# Patient Record
Sex: MALE
[De-identification: names, ages, dates, MRNs, and addresses within clinical notes are randomized per-mention and may not be internally consistent; named-entity substitution may affect disease eponyms.]

---

## 2020-02-21 ENCOUNTER — APPOINTMENT (OUTPATIENT)
Dept: ORTHOPEDIC SURGERY | Facility: CLINIC | Age: 45
End: 2020-02-21
Payer: COMMERCIAL

## 2020-02-21 VITALS
HEART RATE: 86 BPM | WEIGHT: 192 LBS | SYSTOLIC BLOOD PRESSURE: 144 MMHG | DIASTOLIC BLOOD PRESSURE: 74 MMHG | BODY MASS INDEX: 30.13 KG/M2 | HEIGHT: 67 IN

## 2020-02-21 DIAGNOSIS — Z78.9 OTHER SPECIFIED HEALTH STATUS: ICD-10-CM

## 2020-02-21 DIAGNOSIS — Z80.9 FAMILY HISTORY OF MALIGNANT NEOPLASM, UNSPECIFIED: ICD-10-CM

## 2020-02-21 PROBLEM — Z00.00 ENCOUNTER FOR PREVENTIVE HEALTH EXAMINATION: Status: ACTIVE | Noted: 2020-02-21

## 2020-02-21 PROCEDURE — 73140 X-RAY EXAM OF FINGER(S): CPT | Mod: F5

## 2020-02-21 PROCEDURE — 99203 OFFICE O/P NEW LOW 30 MIN: CPT

## 2020-02-21 NOTE — DISCUSSION/SUMMARY
[FreeTextEntry1] : He has findings consistent with a right thumb MCP joint radial collateral ligament sprain.  There is no evidence of instability on examination. \par \par I had a discussion regarding today's visit, the diagnosis, and treatment recommendations / options.  He was fitted with a splint.  He was instructed on protection and activity modification.  He will follow-up in 4 weeks.\par \par The patient has agreed to this plan of management and has expressed full understanding.  All questions were fully answered to the patient's satisfaction.\par \par Over 50% of the time spent with the patient was on counseling the patient on the above diagnosis, treatment plan and prognosis.

## 2020-02-21 NOTE — ADDENDUM
[FreeTextEntry1] : I, Jitendra Borrero, acted solely as a scribe for Dr. Jarrett Ramos on 02/21/2020 . \par \par All medical record entries made by the Scribe were at my, Dr. Jarrett Ramos, direction and personally dictated by me on 02/21/2020. I have personally reviewed the chart and agree that the record accurately reflects my personal performance of the history, physical exam, assessment and plan.

## 2020-02-21 NOTE — HISTORY OF PRESENT ILLNESS
[Right] : right hand dominant [FreeTextEntry1] : He comes in today for evaluation of a right thumb injury that occurred 5 weeks ago while playing football. He states that his pain is 7/10 with movement and positionally. He also notes persistent swelling.

## 2020-02-21 NOTE — PHYSICAL EXAM
[de-identified] : - Constitutional: This is a male in no obvious distress.  \par - Psych: Patient is alert and oriented to person, place and time.  Patient has a normal mood and affect.\par - Cardiovascular: Normal pulses throughout the upper extremities.  No significant varicosities are noted in the upper extremities. \par - Neuro: Strength and sensation are intact throughout the upper extremities.  Patient has normal coordination.\par - Respiratory:  Patient exhibits no evidence of shortness of breath or difficulty breathing.\par - Skin: No rashes, lesions, or other abnormalities are noted in the upper extremities.\par \par ---\par \par  Examination of the right thumb demonstrates mild swelling radially along the radial aspect of the MCP joint.  He is tender in this region.  There is no swelling or tenderness along the MCP joint ulnar collateral ligament.  There is no instability to stress testing of the MCP joint radial or ulnar collateral ligaments.  There is no tenderness along the CMC joint.  He is neurovascularly intact distally. [de-identified] : AP, lateral, and oblique radiographs of the right thumb demonstrate no obvious fractures or dislocations.

## 2020-03-18 PROBLEM — S63.641A SPRAIN OF METACARPOPHALANGEAL (MCP) JOINT OF RIGHT THUMB: Status: ACTIVE | Noted: 2020-02-21

## 2020-03-20 ENCOUNTER — APPOINTMENT (OUTPATIENT)
Dept: ORTHOPEDIC SURGERY | Facility: CLINIC | Age: 45
End: 2020-03-20
Payer: COMMERCIAL

## 2020-03-20 DIAGNOSIS — S63.641A SPRAIN OF METACARPOPHALANGEAL JOINT OF RIGHT THUMB, INITIAL ENCOUNTER: ICD-10-CM

## 2020-03-20 PROCEDURE — 99213 OFFICE O/P EST LOW 20 MIN: CPT

## 2020-03-20 NOTE — ADDENDUM
[FreeTextEntry1] : I, Jitendra Borrero, acted solely as a scribe for Dr. Jarrett Ramos on 03/20/2020 . \par \par All medical record entries made by the Scribe were at my, Dr. Jarrett Ramos, direction and personally dictated by me on 03/20/2020. I have personally reviewed the chart and agree that the record accurately reflects my personal performance of the history, physical exam, assessment and plan.

## 2020-03-20 NOTE — DISCUSSION/SUMMARY
[FreeTextEntry1] : I had a discussion regarding today's visit, the diagnosis and treatment recommendations / options.  At this time, I recommended he stop wearing the splint.  As there is no instability, I see no indication for an MRI.  He was instructed on flexion extension exercises.  He will follow-up in 4 to 6 weeks if he is not improving.\par \par The patient has agreed to the above plan of management and has expressed full understanding.  All questions were fully answered to the patient's satisfaction.\par \par I spent at least 25 minutes of face-to-face time with the patient.  Over 50% of this time was spent on counseling the patient on the above diagnosis, treatment plan and prognosis.

## 2020-03-20 NOTE — PHYSICAL EXAM
[de-identified] : - Constitutional: This is a male in no obvious distress.  \par - Psych: Patient is alert and oriented to person, place and time.  Patient has a normal mood and affect.\par - Cardiovascular: Normal pulses throughout the upper extremities.  No significant varicosities are noted in the upper extremities. \par - Neuro: Strength and sensation are intact throughout the upper extremities.  Patient has normal coordination.\par - Respiratory:  Patient exhibits no evidence of shortness of breath or difficulty breathing.\par - Skin: No rashes, lesions, or other abnormalities are noted in the upper extremities.\par \par ---\par \par  Examination of the right thumb demonstrates mild residual swelling radially along the radial aspect of the MCP joint.  He has some residual tenderness in this region.  There is no swelling or tenderness along the MCP joint ulnar collateral ligament.  There is no instability to stress testing of the MCP joint radial or ulnar collateral ligaments.  There is no tenderness along the CMC joint.  He is neurovascularly intact distally. [de-identified] : Previous AP, lateral, and oblique radiographs of the right thumb demonstrated no obvious fractures or dislocations.

## 2020-03-20 NOTE — HISTORY OF PRESENT ILLNESS
[Right] : right hand dominant [FreeTextEntry1] : 9 weeks status post right thumb MCP joint radial collateral ligament sprain.  See note from when he was seen in the office 4 weeks ago.  He was fitted with a splint.\par \par He states that it is difficult for him to wear a brace for long periods of time. He states that it leads to stiffness of the thumb. He states that his pain is 7/10. He denies any numbness or tingling.